# Patient Record
Sex: MALE | Race: BLACK OR AFRICAN AMERICAN | NOT HISPANIC OR LATINO | Employment: FULL TIME | URBAN - METROPOLITAN AREA
[De-identification: names, ages, dates, MRNs, and addresses within clinical notes are randomized per-mention and may not be internally consistent; named-entity substitution may affect disease eponyms.]

---

## 2018-03-26 ENCOUNTER — HOSPITAL ENCOUNTER (EMERGENCY)
Facility: OTHER | Age: 28
Discharge: HOME | End: 2018-03-26
Attending: EMERGENCY MEDICINE
Payer: COMMERCIAL

## 2018-03-26 VITALS
BODY MASS INDEX: 24.25 KG/M2 | HEART RATE: 72 BPM | OXYGEN SATURATION: 100 % | WEIGHT: 160 LBS | DIASTOLIC BLOOD PRESSURE: 86 MMHG | HEIGHT: 68 IN | TEMPERATURE: 98 F | SYSTOLIC BLOOD PRESSURE: 138 MMHG | RESPIRATION RATE: 14 BRPM

## 2018-03-26 DIAGNOSIS — N34.2 URETHRITIS: Primary | ICD-10-CM

## 2018-03-26 DIAGNOSIS — S29.019A THORACIC MYOFASCIAL STRAIN, INITIAL ENCOUNTER: ICD-10-CM

## 2018-03-26 LAB
BILIRUB UR QL STRIP: NEGATIVE
C TRACH DNA SPEC QL NAA+PROBE: NOT DETECTED
CLARITY UR: CLEAR
COLOR UR: YELLOW
GLUCOSE UR QL STRIP: NEGATIVE
HGB UR QL STRIP: NEGATIVE
KETONES UR QL STRIP: NEGATIVE
LEUKOCYTE ESTERASE UR QL STRIP: NEGATIVE
N GONORRHOEA DNA SPEC QL NAA+PROBE: NOT DETECTED
NITRITE UR QL STRIP: NEGATIVE
PH UR STRIP: 6 [PH] (ref 5–8)
PROT UR QL STRIP: NEGATIVE
SP GR UR STRIP: >=1.03 (ref 1–1.03)
URN SPEC COLLECT METH UR: ABNORMAL
UROBILINOGEN UR STRIP-ACNC: NEGATIVE EU/DL

## 2018-03-26 PROCEDURE — 81003 URINALYSIS AUTO W/O SCOPE: CPT

## 2018-03-26 PROCEDURE — 87086 URINE CULTURE/COLONY COUNT: CPT

## 2018-03-26 PROCEDURE — 25000003 PHARM REV CODE 250: Performed by: EMERGENCY MEDICINE

## 2018-03-26 PROCEDURE — 63600175 PHARM REV CODE 636 W HCPCS: Performed by: EMERGENCY MEDICINE

## 2018-03-26 PROCEDURE — 99283 EMERGENCY DEPT VISIT LOW MDM: CPT | Mod: 25

## 2018-03-26 PROCEDURE — 87491 CHLMYD TRACH DNA AMP PROBE: CPT

## 2018-03-26 PROCEDURE — 96372 THER/PROPH/DIAG INJ SC/IM: CPT

## 2018-03-26 RX ORDER — AZITHROMYCIN 250 MG/1
1000 TABLET, FILM COATED ORAL
Status: COMPLETED | OUTPATIENT
Start: 2018-03-26 | End: 2018-03-26

## 2018-03-26 RX ORDER — CEFTRIAXONE 250 MG/1
250 INJECTION, POWDER, FOR SOLUTION INTRAMUSCULAR; INTRAVENOUS
Status: COMPLETED | OUTPATIENT
Start: 2018-03-26 | End: 2018-03-26

## 2018-03-26 RX ORDER — ONDANSETRON 4 MG/1
4 TABLET, ORALLY DISINTEGRATING ORAL
Status: COMPLETED | OUTPATIENT
Start: 2018-03-26 | End: 2018-03-26

## 2018-03-26 RX ORDER — IBUPROFEN 600 MG/1
600 TABLET ORAL EVERY 6 HOURS PRN
Qty: 20 TABLET | Refills: 0 | Status: SHIPPED | OUTPATIENT
Start: 2018-03-26

## 2018-03-26 RX ORDER — IBUPROFEN 600 MG/1
600 TABLET ORAL
Status: COMPLETED | OUTPATIENT
Start: 2018-03-26 | End: 2018-03-26

## 2018-03-26 RX ORDER — METHOCARBAMOL 500 MG/1
1000 TABLET, FILM COATED ORAL 3 TIMES DAILY PRN
Qty: 30 TABLET | Refills: 0 | Status: SHIPPED | OUTPATIENT
Start: 2018-03-26 | End: 2018-03-31

## 2018-03-26 RX ADMIN — AZITHROMYCIN 1000 MG: 250 TABLET, FILM COATED ORAL at 11:03

## 2018-03-26 RX ADMIN — IBUPROFEN 600 MG: 600 TABLET, FILM COATED ORAL at 10:03

## 2018-03-26 RX ADMIN — ONDANSETRON 4 MG: 4 TABLET, ORALLY DISINTEGRATING ORAL at 11:03

## 2018-03-26 RX ADMIN — CEFTRIAXONE SODIUM 250 MG: 250 INJECTION, POWDER, FOR SOLUTION INTRAMUSCULAR; INTRAVENOUS at 11:03

## 2018-03-26 NOTE — ED NOTES
Pt presents with c/o yellow penile discharge x4 days. Pt denies any burning or hematuria. Pt states he has been checked for STD's and results were negative.

## 2018-03-26 NOTE — ED PROVIDER NOTES
Encounter Date: 3/26/2018    SCRIBE #1 NOTE: I, Gypsy Bar, am scribing for, and in the presence of, Dr. Saunders.       History     Chief Complaint   Patient presents with    Penile Discharge     Pt c/o penile discharge x 4 days. He is also c/o LLQ pain. Denies dysuria or hematuria. Pt also wants to have his back checked out for upper back pain for unknown period of time.     Time seen by provider: 9:55 AM    This is a 27 y.o. male who presents with complaint of penile discharge. He reports vague LLQ abdominal pain and urinary frequency. He denies dysuria, penile pain, scrotal swelling, or testicular pain. He notes having unprotected sex. He was seen by his PCP for similar symptoms, and tested negative for STDs.    Patient also reports intermittent upper back pain. He notes heavy lifting at work. He has the area massaged every day and has not taken any OTC medication for pain.     He denies fever, chills, cough, SOB, chest pain, or difficulty ambulating. He quit smoking 6 months ago and reports occasional use of alcohol and marijuana, but denies use of other illicit drugs.      The history is provided by the patient.     Review of patient's allergies indicates:  No Known Allergies  History reviewed. No pertinent past medical history.  History reviewed. No pertinent surgical history.  No family history on file.  Social History   Substance Use Topics    Smoking status: Current Some Day Smoker    Smokeless tobacco: Not on file    Alcohol use Yes      Comment: sometimes     Review of Systems   Constitutional: Negative for chills and fever.   HENT: Negative for congestion and sore throat.    Eyes: Negative for visual disturbance.   Respiratory: Negative for cough and shortness of breath.    Cardiovascular: Negative for chest pain and palpitations.   Gastrointestinal: Positive for abdominal pain. Negative for diarrhea, nausea and vomiting.   Genitourinary: Positive for discharge, frequency and urgency. Negative for  decreased urine volume, dysuria, enuresis, penile pain, penile swelling, scrotal swelling and testicular pain.   Musculoskeletal: Positive for back pain. Negative for gait problem, joint swelling, neck pain and neck stiffness.   Skin: Negative for rash and wound.   Neurological: Negative for weakness, numbness and headaches.   Psychiatric/Behavioral: Negative for behavioral problems and confusion.       Physical Exam     Initial Vitals [03/26/18 0934]   BP Pulse Resp Temp SpO2   137/83 62 18 97.6 °F (36.4 °C) 99 %      MAP       101         Physical Exam    Nursing note and vitals reviewed.  Constitutional: He appears well-developed and well-nourished. He is not diaphoretic. No distress.   HENT:   Head: Normocephalic and atraumatic.   Eyes: Conjunctivae and EOM are normal. No scleral icterus.   Neck: Normal range of motion. Neck supple.   Cardiovascular: Normal rate, regular rhythm and normal heart sounds. Exam reveals no gallop and no friction rub.    No murmur heard.  Pulmonary/Chest: Breath sounds normal. No respiratory distress. He has no wheezes. He has no rhonchi. He has no rales.   Abdominal: Soft. Bowel sounds are normal. He exhibits no distension. There is no tenderness. There is no rebound and no guarding.   Musculoskeletal: Normal range of motion. He exhibits tenderness (bilateral thoracic muscles). He exhibits no edema.   Neurological: He is alert and oriented to person, place, and time.   Skin: Skin is warm and dry. No rash and no abscess noted. No erythema. No pallor.   Psychiatric: He has a normal mood and affect. His behavior is normal.         ED Course   Procedures  Labs Reviewed   URINALYSIS - Abnormal; Notable for the following:        Result Value    Specific Gravity, UA >=1.030 (*)     All other components within normal limits   C. TRACHOMATIS/N. GONORRHOEAE BY AMP DNA   CULTURE, URINE             Medical Decision Making:   Clinical Tests:   Lab Tests: Ordered and Reviewed  ED  Management:  Emergent evaluation a 27-year-old male with multiple complaints including dysuria, upper back pain after heavy lifting, and vague left lower quadrant abdominal pain.  Vital signs and physical exam are benign.  Only abnormality on physical exam is tenderness in the thoracic paraspinal muscles.  Urinalysis appears clear, but patient requested STD treatment.  He was treated with Rocephin and azithromycin, ibuprofen was given for back pain.  I suspect nonspecific urethritis, thoracic strain.  Although he had complained of abdominal pain, abdomen was completely soft and nontender on exam.  I do not suspect testicular torsion and he declined genital evaluation.  He was discharged in good condition.            Scribe Attestation:   Scribe #1: I performed the above scribed service and the documentation accurately describes the services I performed. I attest to the accuracy of the note.    Attending Attestation:           Physician Attestation for Scribe:  Physician Attestation Statement for Scribe #1: I, Dr. Saunders, reviewed documentation, as scribed by Gypsy Bar in my presence, and it is both accurate and complete.                    Clinical Impression:     1. Urethritis    2. Thoracic myofascial strain, initial encounter                               Kae Saunders MD  03/27/18 6896

## 2018-03-26 NOTE — ED NOTES
"Pt anxious about IM injection as pt reports getting ceftriaxone shot before and states "It burned so bad." Pt counseled on injection and care after injection. Injection given to right vastus lateralis. Pt tolerated well.   "

## 2018-03-27 LAB — BACTERIA UR CULT: NO GROWTH

## 2018-10-03 ENCOUNTER — OFFICE VISIT (OUTPATIENT)
Dept: UROLOGY | Facility: CLINIC | Age: 28
End: 2018-10-03
Payer: COMMERCIAL

## 2018-10-03 VITALS
DIASTOLIC BLOOD PRESSURE: 68 MMHG | WEIGHT: 183 LBS | SYSTOLIC BLOOD PRESSURE: 115 MMHG | HEART RATE: 76 BPM | HEIGHT: 68 IN | BODY MASS INDEX: 27.74 KG/M2

## 2018-10-03 DIAGNOSIS — R36.9 PENILE DISCHARGE: Primary | ICD-10-CM

## 2018-10-03 DIAGNOSIS — R30.0 DYSURIA: ICD-10-CM

## 2018-10-03 PROCEDURE — 99203 OFFICE O/P NEW LOW 30 MIN: CPT | Mod: 25,S$GLB,, | Performed by: NURSE PRACTITIONER

## 2018-10-03 PROCEDURE — 99999 PR PBB SHADOW E&M-EST. PATIENT-LVL IV: CPT | Mod: PBBFAC,,, | Performed by: NURSE PRACTITIONER

## 2018-10-03 PROCEDURE — 87661 TRICHOMONAS VAGINALIS AMPLIF: CPT

## 2018-10-03 PROCEDURE — 87086 URINE CULTURE/COLONY COUNT: CPT

## 2018-10-03 PROCEDURE — 81002 URINALYSIS NONAUTO W/O SCOPE: CPT | Mod: S$GLB,,, | Performed by: NURSE PRACTITIONER

## 2018-10-03 PROCEDURE — 87491 CHLMYD TRACH DNA AMP PROBE: CPT

## 2018-10-03 NOTE — PATIENT INSTRUCTIONS
Good water intake 2-3 liters per day    Avoid Bladder Irritants: Tea, coffee, caffeine, alcohol, artificial sweeteners, citrus, spicy foods, acidic foods,chocolate, tomato-based foods, smoking    Use protection (condoms)             Cystoscopy    Cystoscopy is a procedure that lets your doctor look directly inside your urethra and bladder. It can be used to:  · Help diagnose a problem with your urethra, bladder, or kidneys.  · Take a sample (biopsy) of bladder or urethral tissue.  · Treat certain problems (such as removing kidney stones).  · Place a stent to bypass an obstruction.  · Take special X-rays of the kidneys.  Based on the findings, your doctor may recommend other tests or treatments.  What is a cystoscope?  A cystoscope is a telescope-like instrument that contains lenses and fiberoptics (small glass wires that make bright light). The cystoscope may be straight and rigid, or flexible to bend around curves in the urethra. The doctor may look directly into the cystoscope, or project the image onto a monitor.  Getting ready  · Ask your doctor if you should stop taking any medicines before the procedure.  · Ask whether you should avoid eating or drinking anything after midnight before the procedure.  · Follow any other instructions your doctor gives you.  Tell your doctor before the exam if you:  · Take any medicines, such as aspirin or blood thinners  · Have allergies to any medicines  · Are pregnant   The procedure  Cystoscopy is done in the doctors office, surgery center, or hospital. The doctor and a nurse are present during the procedure. It takes only a few minutes, longer if a biopsy, X-ray, or treatment needs to be done.  During the procedure:  · You lie on an exam table on your back, knees bent and legs apart. You are covered with a drape.  · Your urethra and the area around it are washed. Anesthetic jelly may be applied to numb the urethra. Other pain medicine is usually not needed. In some cases,  you may be offered a mild sedative to help you relax. If a more extensive procedure is to be done, such as a biopsy or kidney stone removal, general anesthesia may be needed.  · The cystoscope is inserted. A sterile fluid is put into the bladder to expand it. You may feel pressure from this fluid.  · When the procedure is done, the cystoscope is removed.  After the procedure  If you had a sedative, general anesthesia, or spinal anesthesia, you must have someone drive you home. Once youre home:  · Drink plenty of fluids.  · You may have burning or light bleeding when you urinate--this is normal.  · Medicines may be prescribed to ease any discomfort or prevent infection. Take these as directed.  · Call your doctor if you have heavy bleeding or blood clots, burning that lasts more than a day, a fever over 100°F  (38° C), or trouble urinating.  Date Last Reviewed: 1/1/2017  © 8579-2123 [employer]. [address] [place] [address], [place] [address]. All rights reserved. This information is not intended as a substitute for professional medical care. Always follow your healthcare professional's instructions.

## 2018-10-04 LAB
BACTERIA UR CULT: NO GROWTH
C TRACH DNA SPEC QL NAA+PROBE: NOT DETECTED
N GONORRHOEA DNA SPEC QL NAA+PROBE: NOT DETECTED

## 2018-10-04 NOTE — PROGRESS NOTES
"Subjective:       Patient ID: Elvis Orosco is a 27 y.o. male.    Chief Complaint: Penile discharge      HPI: Elvis Orosco is a 27 y.o. Black or  male who presents today for evaluation and management of penile discharge. This is his initial clinic visit.    Pt was seen in ED 3/26/18 for penile discharge and LLQ pain. He was seen previously by his PCP for same complaint and all STI testing was negative but was treated with antibiotics. He was treated again in ED with antibiotics. His urine culture was negative and gonorrhea/chlamydia was not detected. In 5/2018, discharge reappeared and he was treated again with antibiotics. Symptoms resolved. About 1 month ago, his girlfriend has a "bacterial" infection and was treated. He was asymptomatic at the time but he was given antibiotics just in case.    Today he reports white discharge from penis that started 1-2 weeks ago. He reports if he only drinks water, there is no discharge but if he drinks soft drinks or any bladder irritants, he has the discharge. He reports mild dysuria that worsens with alcohol and soft drinks. Denies hematuria or flank pain. He reports LLQ abdominal pain that is intermittent and occurs only a few times a month. Denies LLQ pain today in clinic. He reports his left testicle will have a discomfort 1-2/10 that may occur once a week. Denies testicle pain in clinic today. Denies scrotal swelling. Denies any history of kidney stones. Denies multiple sexual partners. He reports he does have unprotected intercourse with his girlfriend.   Denies any urinary complaints. Denies f/c/n/v.     Review of patient's allergies indicates:  No Known Allergies    Current Outpatient Medications   Medication Sig Dispense Refill    ibuprofen (ADVIL,MOTRIN) 600 MG tablet Take 1 tablet (600 mg total) by mouth every 6 (six) hours as needed for Pain. 20 tablet 0     No current facility-administered medications for this visit.        History reviewed. No " pertinent past medical history.    History reviewed. No pertinent surgical history.    History reviewed. No pertinent family history.    Review of Systems   Constitutional: Negative for chills, diaphoresis and fever.   HENT: Negative for congestion.    Eyes: Negative for discharge.   Respiratory: Negative for chest tightness and shortness of breath.    Cardiovascular: Negative for chest pain and palpitations.   Gastrointestinal: Positive for abdominal pain (LLQ- intermittent). Negative for constipation, diarrhea, nausea and vomiting.   Genitourinary: Positive for discharge, dysuria (intermittent) and testicular pain (intermittent). Negative for decreased urine volume, difficulty urinating, flank pain, frequency, genital sores, hematuria, penile pain, penile swelling, scrotal swelling and urgency.   Musculoskeletal: Negative for gait problem.   Skin: Negative for rash.   Allergic/Immunologic: Negative for immunocompromised state.   Neurological: Negative for seizures and headaches.   Hematological: Negative for adenopathy.   Psychiatric/Behavioral: Negative for confusion.         All other systems were reviewed and were negative.    Objective:     Vitals:    10/03/18 1559   BP: 115/68   Pulse: 76        Physical Exam   Nursing note and vitals reviewed.  Constitutional: He is oriented to person, place, and time. He appears well-developed and well-nourished.  Non-toxic appearance. He does not have a sickly appearance. He does not appear ill. No distress.   HENT:   Head: Normocephalic.   Eyes: Right eye exhibits no discharge. Left eye exhibits no discharge.   Neck: Normal range of motion.   Cardiovascular: Normal rate and regular rhythm.    Pulmonary/Chest: Effort normal. No respiratory distress.   Abdominal: Soft. He exhibits no distension. There is no tenderness. There is no rebound and no guarding.   Genitourinary: Right testis shows no mass, no swelling and no tenderness. Left testis shows no mass, no swelling and  no tenderness. Circumcised. No penile erythema or penile tenderness. No discharge found.   Genitourinary Comments: Pt refused prostate exam today in clinic   Musculoskeletal: Normal range of motion.   Neurological: He is alert and oriented to person, place, and time.   Skin: Skin is warm and dry.     Psychiatric: He has a normal mood and affect. His behavior is normal. Judgment and thought content normal.       POCT UA: sp grav 1.010, pH 8, negative results    Assessment:       1. Penile discharge    2. Dysuria        Plan:     Elvis was seen today for other.    Diagnoses and all orders for this visit:    Penile discharge  -     POCT urinalysis, dipstick or tablet reag  -     Urine culture  -     C. trachomatis/N. gonorrhoeae by AMP DNA [facility]; Urine  -     Trichomonas vaginalis, RNA, Qual, Urine  -     RPR; Future  -     HERPES SIMPLEX 1&2 IGG; Future    Dysuria  -     POCT urinalysis, dipstick or tablet reag  -     Urine culture  -     C. trachomatis/N. gonorrhoeae by AMP DNA [facility]; Urine  -     Trichomonas vaginalis, RNA, Qual, Urine  -     RPR; Future  -     HERPES SIMPLEX 1&2 IGG; Future      -Discussed plan with patient  -Urine specimen sent for urine culture, trichomonas, chlamydia/gonorrhea  -RPR and herpes lab ordered and scheduled  -Increase water to 2-3 liters per day  -Avoid Bladder Irritants: Tea, coffee, caffeine, alcohol, artificial sweeteners, citrus, spicy foods, acidic foods,chocolate, tomato-based foods, smoking  -If all testing in negative and continues to have symptoms, recommended cysto to evaluate the bladder further  -RTC 4-6 weeks     I spent 35 minutes with the patient of which more than half was spent in coordinating the patient's care as well as in direct consultation with the patient in regards to our treatment and plan.

## 2018-10-05 LAB
TRICHOMONAS VAGINALIS RNA, QUAL, SOURCE: NORMAL
TRICHOMONAS VAGINALIS, QL, TMA: NOT DETECTED

## 2018-10-08 ENCOUNTER — TELEPHONE (OUTPATIENT)
Dept: PEDIATRIC UROLOGY | Facility: CLINIC | Age: 28
End: 2018-10-08

## 2018-10-08 ENCOUNTER — PATIENT MESSAGE (OUTPATIENT)
Dept: UROLOGY | Facility: CLINIC | Age: 28
End: 2018-10-08

## 2018-10-08 NOTE — TELEPHONE ENCOUNTER
Called patient regarding his STI testing results.    RPR non-reactive  Gonorrhea / chlamydia not detected  Urine culture negative    Herpes 1&2 positive  Pt does not have any lesions at this time so instructed for him to notify clinic if he has an outbreak for initial treatment.    Discussed importance of using protection (condoms) for intercourse.

## 2018-10-08 NOTE — TELEPHONE ENCOUNTER
Returned patient's call regarding HSV treatment. When patient has an outbreak, he can call clinic for treatment. He verbalized understanding    He has been drinking water and white discharge has not been occurring. Discussed it could be prostate secretions or semen. He will need prostate exam and further evaluation if continues. He verbalized understanding.

## 2018-10-13 ENCOUNTER — HOSPITAL ENCOUNTER (EMERGENCY)
Facility: OTHER | Age: 28
Discharge: HOME | End: 2018-10-13
Attending: EMERGENCY MEDICINE
Payer: COMMERCIAL

## 2018-10-13 VITALS
OXYGEN SATURATION: 100 % | HEIGHT: 71 IN | BODY MASS INDEX: 25.52 KG/M2 | RESPIRATION RATE: 16 BRPM | DIASTOLIC BLOOD PRESSURE: 82 MMHG | SYSTOLIC BLOOD PRESSURE: 146 MMHG | TEMPERATURE: 98 F | HEART RATE: 69 BPM

## 2018-10-13 DIAGNOSIS — Z76.0 MEDICATION REFILL: ICD-10-CM

## 2018-10-13 DIAGNOSIS — Z71.1 PHYSICALLY WELL BUT WORRIED: Primary | ICD-10-CM

## 2018-10-13 PROCEDURE — 99283 EMERGENCY DEPT VISIT LOW MDM: CPT

## 2018-10-13 RX ORDER — MELOXICAM 7.5 MG/1
7.5 TABLET ORAL DAILY
Qty: 10 TABLET | Refills: 0 | Status: SHIPPED | OUTPATIENT
Start: 2018-10-13 | End: 2018-10-23

## 2018-10-13 NOTE — ED PROVIDER NOTES
"Encounter Date: 10/13/2018       History     Chief Complaint   Patient presents with    wellness check     pt requesting blood work. Also requesting prescription of meloxicam for "muscle pain".      Patient is a 27-year-old male who presents with request for 2nd opinion after receiving diagnosis of genital herpes by his urologist.  He reports this diagnosis was made at [facility] and patient admits that he currently has no symptoms at this time.  Patient admits he does not believe this diagnosis and is requesting a 2nd opinion with definitive blood testing result done today in the emergency department.  He also has request for refill on prescription for Mobic.  He reports that he takes this only occasionally for muscle ache.  He has no current muscle aches at this time.  He has no associated fever, chills, nausea, vomiting, chest pain or shortness of breath.  He is currently accompanied by his female significant other and a young child who is at bedside.          Review of patient's allergies indicates:  No Known Allergies  No past medical history on file.  No past surgical history on file.  No family history on file.  Social History     Tobacco Use    Smoking status: Current Some Day Smoker   Substance Use Topics    Alcohol use: Yes     Comment: sometimes    Drug use: No     Review of Systems   Constitutional: Negative for fever.   HENT: Negative for sore throat.    Respiratory: Negative for shortness of breath.    Cardiovascular: Negative for chest pain.   Gastrointestinal: Negative for nausea.   Genitourinary: Negative for dysuria.   Musculoskeletal: Negative for back pain.   Skin: Negative for rash.   Neurological: Negative for weakness.   Hematological: Does not bruise/bleed easily.       Physical Exam     Initial Vitals [10/13/18 1252]   BP Pulse Resp Temp SpO2   (!) 146/82 69 16 98.4 °F (36.9 °C) 100 %      MAP       --         Physical Exam    Nursing note and vitals reviewed.  Constitutional: " He appears well-developed and well-nourished. He is not diaphoretic. No distress.   Healthy-appearing male in no acute distress or apparent pain.  Makes good eye contact, speaks in clear full sentences and ambulates with ease.   HENT:   Head: Normocephalic and atraumatic.   Eyes: Conjunctivae and EOM are normal. Pupils are equal, round, and reactive to light. Right eye exhibits no discharge. Left eye exhibits no discharge. No scleral icterus.   Neck: Normal range of motion.   Cardiovascular: Normal rate, regular rhythm, normal heart sounds and intact distal pulses. Exam reveals no gallop and no friction rub.    No murmur heard.  Pulmonary/Chest: Breath sounds normal. He has no wheezes. He has no rhonchi. He has no rales.   Abdominal: Soft. Bowel sounds are normal. There is no tenderness. There is no rebound and no guarding.   Genitourinary:   Genitourinary Comments: Genital exam deferred as patient has no current symptoms.   Musculoskeletal: Normal range of motion. He exhibits no edema or tenderness.   Lymphadenopathy:     He has no cervical adenopathy.   Neurological: He is alert and oriented to person, place, and time. He has normal strength. No cranial nerve deficit.   Skin: Skin is warm. Capillary refill takes less than 2 seconds. No rash and no abscess noted. No erythema.   Psychiatric: He has a normal mood and affect. His behavior is normal. Thought content normal.         ED Course   Procedures  Labs Reviewed - No data to display       Imaging Results    None          Medical Decision Making:   ED Management:  Urgent evaluation a 27-year-old male who presents with request for 2nd opinion and blood testing for genital herpes in the setting of no outbreak.  He is also requesting medication refill for Mobic.  He is afebrile, nontoxic appearing, hemodynamically stable. Physical exam outlined above and is unremarkable.  Genital exam is deferred given patient has no symptoms currently.  I cannot offer patient blood  work for HSV screening here in the emergency department however I provided him with [facility] contact information as well as [facility] contact information so that he can establish care for 2nd opinion and or establish comprehensive STD testing in the outpatient setting.  Mobic prescription is written for patient.  He is educated on ED return precautions and verbalizes understanding.  He is stable for discharge. Patient is seen in PIT and no further RN evaluation is warranted.                      Clinical Impression:   The primary encounter diagnosis was Physically well but worried. A diagnosis of Medication refill was also pertinent to this visit.                             Jojo Zhou PA-C  10/13/18 3309

## 2018-11-20 ENCOUNTER — TELEPHONE (OUTPATIENT)
Dept: UROLOGY | Facility: CLINIC | Age: 28
End: 2018-11-20

## 2018-11-20 NOTE — TELEPHONE ENCOUNTER
"Spoke with pt to reschedule his 11/21 appt due to book out and pt states he wants to cancel appt. States "go ahead and just cancel appt because I found out it was something else causing problem". Encouraged to call office if needs to be seen in the future. Voiced  understanding  "

## 2021-09-23 ENCOUNTER — IMMUNIZATION (OUTPATIENT)
Dept: PRIMARY CARE CLINIC | Facility: CLINIC | Age: 31
End: 2021-09-23
Payer: COMMERCIAL

## 2021-09-23 DIAGNOSIS — Z23 NEED FOR VACCINATION: Primary | ICD-10-CM

## 2021-09-23 PROCEDURE — 91301 COVID-19, MRNA, LNP-S, PF, 100 MCG/0.5 ML DOSE VACCINE: CPT | Mod: PBBFAC | Performed by: FAMILY MEDICINE

## 2021-10-21 ENCOUNTER — IMMUNIZATION (OUTPATIENT)
Dept: PRIMARY CARE CLINIC | Facility: CLINIC | Age: 31
End: 2021-10-21
Payer: COMMERCIAL

## 2021-10-21 DIAGNOSIS — Z23 NEED FOR VACCINATION: Primary | ICD-10-CM

## 2021-10-21 PROCEDURE — 0012A COVID-19, MRNA, LNP-S, PF, 100 MCG/0.5 ML DOSE VACCINE: CPT | Mod: PBBFAC | Performed by: FAMILY MEDICINE

## 2023-05-18 ENCOUNTER — HOSPITAL ENCOUNTER (EMERGENCY)
Facility: HOSPITAL | Age: 33
Discharge: HOME | End: 2023-05-19
Attending: EMERGENCY MEDICINE
Payer: COMMERCIAL

## 2023-05-18 DIAGNOSIS — R53.83 FATIGUE, UNSPECIFIED TYPE: Primary | ICD-10-CM

## 2023-05-18 NOTE — Clinical Note
"Elvis Starks" Ana Luisa was seen and treated in our emergency department on 5/18/2023.  He may return to work on 05/23/2023.       If you have any questions or concerns, please don't hesitate to call.      Anand Perkins MD"

## 2023-05-19 VITALS
WEIGHT: 165 LBS | HEART RATE: 91 BPM | HEIGHT: 68 IN | SYSTOLIC BLOOD PRESSURE: 121 MMHG | TEMPERATURE: 100 F | RESPIRATION RATE: 18 BRPM | DIASTOLIC BLOOD PRESSURE: 64 MMHG | OXYGEN SATURATION: 99 % | BODY MASS INDEX: 25.01 KG/M2

## 2023-05-19 LAB
ALBUMIN SERPL BCP-MCNC: 4.3 G/DL (ref 3.5–5.2)
ALP SERPL-CCNC: 36 U/L (ref 55–135)
ALT SERPL W/O P-5'-P-CCNC: 26 U/L (ref 10–44)
ANION GAP SERPL CALC-SCNC: 11 MMOL/L (ref 8–16)
AST SERPL-CCNC: 37 U/L (ref 10–40)
BASOPHILS # BLD AUTO: 0.02 K/UL (ref 0–0.2)
BASOPHILS NFR BLD: 0.2 % (ref 0–1.9)
BILIRUB SERPL-MCNC: 0.7 MG/DL (ref 0.1–1)
BUN SERPL-MCNC: 13 MG/DL (ref 6–20)
CALCIUM SERPL-MCNC: 9 MG/DL (ref 8.7–10.5)
CHLORIDE SERPL-SCNC: 103 MMOL/L (ref 95–110)
CK SERPL-CCNC: 277 U/L (ref 20–200)
CO2 SERPL-SCNC: 23 MMOL/L (ref 23–29)
CREAT SERPL-MCNC: 1 MG/DL (ref 0.5–1.4)
DIFFERENTIAL METHOD: ABNORMAL
EOSINOPHIL # BLD AUTO: 0.1 K/UL (ref 0–0.5)
EOSINOPHIL NFR BLD: 1.1 % (ref 0–8)
ERYTHROCYTE [DISTWIDTH] IN BLOOD BY AUTOMATED COUNT: 13.6 % (ref 11.5–14.5)
EST. GFR  (NO RACE VARIABLE): >60 ML/MIN/1.73 M^2
GLUCOSE SERPL-MCNC: 127 MG/DL (ref 70–110)
HCT VFR BLD AUTO: 39.5 % (ref 40–54)
HGB BLD-MCNC: 13.2 G/DL (ref 14–18)
IMM GRANULOCYTES # BLD AUTO: 0.04 K/UL (ref 0–0.04)
IMM GRANULOCYTES NFR BLD AUTO: 0.3 % (ref 0–0.5)
LYMPHOCYTES # BLD AUTO: 0.4 K/UL (ref 1–4.8)
LYMPHOCYTES NFR BLD: 3.3 % (ref 18–48)
MCH RBC QN AUTO: 28.4 PG (ref 27–31)
MCHC RBC AUTO-ENTMCNC: 33.4 G/DL (ref 32–36)
MCV RBC AUTO: 85 FL (ref 82–98)
MONOCYTES # BLD AUTO: 0.3 K/UL (ref 0.3–1)
MONOCYTES NFR BLD: 2.2 % (ref 4–15)
NEUTROPHILS # BLD AUTO: 10.9 K/UL (ref 1.8–7.7)
NEUTROPHILS NFR BLD: 92.9 % (ref 38–73)
NRBC BLD-RTO: 0 /100 WBC
PLATELET # BLD AUTO: 269 K/UL (ref 150–450)
PMV BLD AUTO: 9.4 FL (ref 9.2–12.9)
POTASSIUM SERPL-SCNC: 3.5 MMOL/L (ref 3.5–5.1)
PROT SERPL-MCNC: 7.3 G/DL (ref 6–8.4)
RBC # BLD AUTO: 4.64 M/UL (ref 4.6–6.2)
SODIUM SERPL-SCNC: 137 MMOL/L (ref 136–145)
WBC # BLD AUTO: 11.74 K/UL (ref 3.9–12.7)

## 2023-05-19 PROCEDURE — 82550 ASSAY OF CK (CPK): CPT | Performed by: STUDENT IN AN ORGANIZED HEALTH CARE EDUCATION/TRAINING PROGRAM

## 2023-05-19 PROCEDURE — 99284 EMERGENCY DEPT VISIT MOD MDM: CPT

## 2023-05-19 PROCEDURE — 63600175 PHARM REV CODE 636 W HCPCS: Performed by: STUDENT IN AN ORGANIZED HEALTH CARE EDUCATION/TRAINING PROGRAM

## 2023-05-19 PROCEDURE — 93005 ELECTROCARDIOGRAM TRACING: CPT | Performed by: INTERNAL MEDICINE

## 2023-05-19 PROCEDURE — 80053 COMPREHEN METABOLIC PANEL: CPT | Performed by: STUDENT IN AN ORGANIZED HEALTH CARE EDUCATION/TRAINING PROGRAM

## 2023-05-19 PROCEDURE — 93010 EKG 12-LEAD: ICD-10-PCS | Mod: ,,, | Performed by: INTERNAL MEDICINE

## 2023-05-19 PROCEDURE — 85025 COMPLETE CBC W/AUTO DIFF WBC: CPT | Performed by: STUDENT IN AN ORGANIZED HEALTH CARE EDUCATION/TRAINING PROGRAM

## 2023-05-19 PROCEDURE — 93010 ELECTROCARDIOGRAM REPORT: CPT | Mod: ,,, | Performed by: INTERNAL MEDICINE

## 2023-05-19 PROCEDURE — 96360 HYDRATION IV INFUSION INIT: CPT

## 2023-05-19 RX ADMIN — SODIUM CHLORIDE, SODIUM LACTATE, POTASSIUM CHLORIDE, AND CALCIUM CHLORIDE 1000 ML: .6; .31; .03; .02 INJECTION, SOLUTION INTRAVENOUS at 12:05

## 2023-05-19 NOTE — ED PROVIDER NOTES
Encounter Date: 5/18/2023       History     Chief Complaint   Patient presents with    Fatigue     Pt states he works in the heat and tonight felt generalized weakness, near syncopal episode with nausea and vomiting      HPI    32-year-old male with no significant past medical history presenting to feeling fatigued and 1 episode of nausea and vomiting today.  Patient is a male man and was working outside all day.  He reports drinking 4 energy drinks and not eating much food or drinking any water.  At the end of the day patient was feeling fatigued and took a nap.  After waking up he continued to feel very tired and had an episode of lightheadedness and nausea and vomiting.  Patient denies any loss of consciousness, chest pain, shortness of breath, fever, muscle cramps.  After his episode of vomiting he reports no longer having any nausea in his feeling better.    Review of patient's allergies indicates:   Allergen Reactions    Shellfish containing products Swelling     No past medical history on file.  No past surgical history on file.  No family history on file.  Social History     Tobacco Use    Smoking status: Some Days   Substance Use Topics    Alcohol use: Yes     Comment: sometimes    Drug use: No     Review of Systems   Constitutional:  Negative for activity change, appetite change and fever.   HENT:  Negative for congestion and rhinorrhea.    Respiratory:  Negative for shortness of breath.    Cardiovascular:  Negative for chest pain and palpitations.   Gastrointestinal:  Positive for nausea and vomiting. Negative for abdominal pain.   Genitourinary:  Negative for dysuria.   Musculoskeletal:  Negative for back pain.   Skin:  Negative for rash and wound.   Neurological:  Positive for light-headedness. Negative for syncope, weakness and headaches.     Physical Exam     Initial Vitals [05/18/23 2257]   BP Pulse Resp Temp SpO2   (!) 144/78 107 18 99.3 °F (37.4 °C) 96 %      MAP       --         Physical  Exam    Constitutional: He appears well-developed and well-nourished.   HENT:   Head: Normocephalic and atraumatic.   Eyes: Conjunctivae are normal.   Cardiovascular:  Normal rate.           Pulmonary/Chest: Breath sounds normal.   Abdominal: Abdomen is soft. He exhibits no distension. There is no abdominal tenderness.   Musculoskeletal:         General: No tenderness or edema. Normal range of motion.     Neurological: He is alert and oriented to person, place, and time.   Skin: Skin is warm and dry. Capillary refill takes less than 2 seconds. No rash noted.       ED Course   Procedures  Labs Reviewed   CBC W/ AUTO DIFFERENTIAL - Abnormal; Notable for the following components:       Result Value    Hemoglobin 13.2 (*)     Hematocrit 39.5 (*)     Gran # (ANC) 10.9 (*)     Lymph # 0.4 (*)     Gran % 92.9 (*)     Lymph % 3.3 (*)     Mono % 2.2 (*)     All other components within normal limits   COMPREHENSIVE METABOLIC PANEL - Abnormal; Notable for the following components:    Glucose 127 (*)     Alkaline Phosphatase 36 (*)     All other components within normal limits   CK - Abnormal; Notable for the following components:     (*)     All other components within normal limits     EKG Readings: (Independently Interpreted)   EKG interpreted by me with normal sinus rhythm, normal axis, normal intervals, no significant ST elevation or depression     Imaging Results    None          Medications   lactated ringers bolus 1,000 mL (0 mLs Intravenous Stopped 5/19/23 0142)     Medical Decision Making:   Initial Assessment:   32-year-old male presenting with fatigue and episode of nausea and vomiting after drinking 4 energy drinks and working outside all day.  Vital signs here are stable.  On exam patient is well-appearing with no acute findings.   Differential Diagnosis:   Hypovolemia, rhabdo, MARCO, electrolyte derangement, side effect from energy drinks  Clinical Tests:   Lab Tests: Ordered and Reviewed       <> Summary of  Lab: No MARCO, CK minimally elevated.  H&H is mildly low.  Glucose noted to be in the 130s. No significant electrolyte derangement  ED Management:  Patient received 1 L IV fluids.  Currently feeling at baseline and improved.  Advised patient to follow-up with PCP regarding mild anemia and hypoglycemia.  Encouraged patient to drink more water for hydration.  Return precautions were discussed.  Patient understands and agrees with plan.    Anand Perkins MD  Internal/Emergency Medicine, PGY-V              Attending Attestation:   Physician Attestation Statement for Resident:  As the supervising MD   I agree with the above history.  -:   As the supervising MD I agree with the above PE.     As the supervising MD I agree with the above treatment, course, plan, and disposition.    I have reviewed and agree with the residents interpretation of the following: lab data.                            Clinical Impression:   Final diagnoses:  [R53.83] Fatigue, unspecified type (Primary)        ED Disposition Condition    Discharge Stable          ED Prescriptions    None       Follow-up Information       Follow up With Specialties Details Why Contact Info Additional Information    [facility] - Emergency Dept Emergency Medicine  As needed, If symptoms worsen [address] [facility] [id number] [phone] 1st floor    [facility] - Med Records  Schedule an appointment as soon as possible for a visit  For follow-up as needed [address] [facility] [id number] [phone]                Anand Perkins MD  Resident  05/19/23 0102       Juan Moore MD  05/19/23 0256

## 2023-09-30 ENCOUNTER — HOSPITAL ENCOUNTER (EMERGENCY)
Facility: HOSPITAL | Age: 33
Discharge: HOME | End: 2023-09-30
Attending: STUDENT IN AN ORGANIZED HEALTH CARE EDUCATION/TRAINING PROGRAM
Payer: COMMERCIAL

## 2023-09-30 VITALS
WEIGHT: 175 LBS | TEMPERATURE: 99 F | DIASTOLIC BLOOD PRESSURE: 74 MMHG | OXYGEN SATURATION: 100 % | SYSTOLIC BLOOD PRESSURE: 130 MMHG | RESPIRATION RATE: 16 BRPM | HEIGHT: 68 IN | HEART RATE: 60 BPM | BODY MASS INDEX: 26.52 KG/M2

## 2023-09-30 DIAGNOSIS — Z20.2 POSSIBLE EXPOSURE TO STD: ICD-10-CM

## 2023-09-30 DIAGNOSIS — N48.89 PENILE PAIN: Primary | ICD-10-CM

## 2023-09-30 LAB
BILIRUB UR QL STRIP: NEGATIVE
CLARITY UR: CLEAR
COLOR UR: YELLOW
GLUCOSE UR QL STRIP: NEGATIVE
HGB UR QL STRIP: NEGATIVE
HIV1+2 IGG SERPL QL IA.RAPID: NEGATIVE
KETONES UR QL STRIP: NEGATIVE
LEUKOCYTE ESTERASE UR QL STRIP: NEGATIVE
NITRITE UR QL STRIP: NEGATIVE
PH UR STRIP: 6 [PH] (ref 5–8)
PROT UR QL STRIP: ABNORMAL
SP GR UR STRIP: >1.03 (ref 1–1.03)
URN SPEC COLLECT METH UR: ABNORMAL
UROBILINOGEN UR STRIP-ACNC: NEGATIVE EU/DL

## 2023-09-30 PROCEDURE — 86592 SYPHILIS TEST NON-TREP QUAL: CPT

## 2023-09-30 PROCEDURE — 87529 HSV DNA AMP PROBE: CPT | Mod: 91

## 2023-09-30 PROCEDURE — 99284 EMERGENCY DEPT VISIT MOD MDM: CPT | Mod: 25

## 2023-09-30 PROCEDURE — 63600175 PHARM REV CODE 636 W HCPCS: Performed by: STUDENT IN AN ORGANIZED HEALTH CARE EDUCATION/TRAINING PROGRAM

## 2023-09-30 PROCEDURE — 25000003 PHARM REV CODE 250

## 2023-09-30 PROCEDURE — 81003 URINALYSIS AUTO W/O SCOPE: CPT

## 2023-09-30 PROCEDURE — 86703 HIV-1/HIV-2 1 RESULT ANTBDY: CPT

## 2023-09-30 PROCEDURE — 87491 CHLMYD TRACH DNA AMP PROBE: CPT

## 2023-09-30 PROCEDURE — 25000003 PHARM REV CODE 250: Performed by: STUDENT IN AN ORGANIZED HEALTH CARE EDUCATION/TRAINING PROGRAM

## 2023-09-30 PROCEDURE — 96365 THER/PROPH/DIAG IV INF INIT: CPT

## 2023-09-30 RX ORDER — BACITRACIN ZINC 500 UNIT/G
OINTMENT (GRAM) TOPICAL 2 TIMES DAILY
Qty: 28 G | Refills: 0 | Status: SHIPPED | OUTPATIENT
Start: 2023-09-30 | End: 2023-10-07

## 2023-09-30 RX ORDER — DOXYCYCLINE 100 MG/1
100 CAPSULE ORAL ONCE
Status: COMPLETED | OUTPATIENT
Start: 2023-09-30 | End: 2023-09-30

## 2023-09-30 RX ORDER — BACITRACIN 500 [USP'U]/G
OINTMENT TOPICAL
Status: COMPLETED | OUTPATIENT
Start: 2023-09-30 | End: 2023-09-30

## 2023-09-30 RX ORDER — ACETAMINOPHEN 500 MG
1000 TABLET ORAL
Status: COMPLETED | OUTPATIENT
Start: 2023-09-30 | End: 2023-09-30

## 2023-09-30 RX ORDER — CEFTRIAXONE 1 G/1
500 INJECTION, POWDER, FOR SOLUTION INTRAMUSCULAR; INTRAVENOUS
Status: DISCONTINUED | OUTPATIENT
Start: 2023-09-30 | End: 2023-09-30

## 2023-09-30 RX ORDER — DOXYCYCLINE 100 MG/1
100 CAPSULE ORAL 2 TIMES DAILY
Qty: 14 CAPSULE | Refills: 0 | Status: SHIPPED | OUTPATIENT
Start: 2023-09-30 | End: 2023-10-07

## 2023-09-30 RX ADMIN — ACETAMINOPHEN 1000 MG: 500 TABLET ORAL at 09:09

## 2023-09-30 RX ADMIN — BACITRACIN: 500 OINTMENT TOPICAL at 01:09

## 2023-09-30 RX ADMIN — CEFTRIAXONE SODIUM 500 MG: 500 INJECTION, POWDER, FOR SOLUTION INTRAMUSCULAR; INTRAVENOUS at 12:09

## 2023-09-30 RX ADMIN — DOXYCYCLINE HYCLATE 100 MG: 100 CAPSULE ORAL at 12:09

## 2023-09-30 NOTE — Clinical Note
"Elvis Starks" Ana Luisa was seen and treated in our emergency department on 9/30/2023.  He may return to work on 10/04/2023.       If you have any questions or concerns, please don't hesitate to call.      Winnie Leong NP"

## 2023-09-30 NOTE — DISCHARGE INSTRUCTIONS
Please follow up with [facility] for further evaluation and recheck.  You can apply topical bacitracin to your penis.  Alternate Tylenol and ibuprofen as needed for pain.  Please return to the ED for worsening pain, penile swelling, penile discharge, testicular pain or swelling, fever, worsening abdominal pain, vomiting, diarrhea, or any new or worsening concerns.

## 2023-09-30 NOTE — ED PROVIDER NOTES
Encounter Date: 9/30/2023  See mid-level's note for details. I saw and evaluated the patient and agree with the [facility]'s finding and plans as written.   I completed my own history and physical exam of this patient.  Including genital exam that was chaperoned by nurse chaperone.  Patient with localized point tenderness to the mid shaft of the penis with no skin changes.  No penile discharge.  Small inguinal hernia palpated to the left inguinal area with no tenderness to palpation or skin changes.  No rash.  Discussed that most likely patient with tenderness from with the patient reported as increased sexual activity over the weekend.  Reports that during this time period of multiple rounds of intercourse is 1 his penis started hurting.  Discussed rest, monitoring symptoms.  Patient reports possibility of exposure to sexually transmitted infection therefore treated prophylactically here after benefits and risks discussion with patient.  Patient to follow up on chlamydia and gonorrhea results.  Discussed that it was possible that this could be early signs of herpes and to monitor for rash.  Discussed need for close follow-up and strict return precautions discussed.  Radha Beal MD  Emergency Medicine Staff Physician         History     Chief Complaint   Patient presents with    Dysuria     SEEN AND TREATED FOR UTI ON TUES,PUT ON ANTIBX    Exposure to STD     Patient is a 32 y.o. male with no PMH of bladder infections who presents to ED via self for concern for abdominal pain and penile pain which began 8 day(s) ago.  Patient reports last Saturday started feeling sore in his pelvic area.  Patient reports on Monday started having burning with urination.  Patient reports on Tuesday went to an urgent care who told him that he was urine was cleaned but he had swollen lymph nodes in his bilateral groin.  Patient reports they place him on Bactrim.  Patient reports he has been taking the antibiotic as prescribed but his  symptoms have not improved.  Patient reports they told him he could possibly have an inguinal hernia.  Patient denies hematuria, penile discharge, or rash in his genital area.  Patient reports pain in the shaft of his penis near the tip.  Patient denies any known STI exposure but reports he would like to be checked.  Patient reports he has a bowel movement every other day and today when he had bowel movement he noticed blood when he wiped.  Patient denies any blood in his stool or in the toilet.  Patient denies straining when having a bowel movement.  Patient denies a history of hemorrhoids.  Patient is awake and alert in no acute distress.         Review of patient's allergies indicates:   Allergen Reactions    Shellfish containing products Swelling     No past medical history on file.  No past surgical history on file.  No family history on file.  Social History     Tobacco Use    Smoking status: Some Days   Substance Use Topics    Alcohol use: Yes     Comment: sometimes    Drug use: No     Review of Systems   Constitutional:  Negative for fever.   HENT: Negative.  Negative for congestion and sore throat.    Respiratory:  Negative for cough and shortness of breath.    Cardiovascular:  Negative for chest pain.   Gastrointestinal:  Positive for abdominal pain. Negative for abdominal distention, blood in stool, diarrhea, nausea and vomiting.   Genitourinary:  Positive for dysuria and penile pain. Negative for decreased urine volume, difficulty urinating, flank pain, genital sores, hematuria, penile discharge, penile swelling, scrotal swelling, testicular pain and urgency.   Musculoskeletal:  Negative for back pain.   Skin:  Negative for color change, pallor and rash.   Neurological: Negative.  Negative for weakness.   Hematological:  Does not bruise/bleed easily.   Psychiatric/Behavioral: Negative.         Physical Exam     Initial Vitals [09/30/23 0909]   BP Pulse Resp Temp SpO2   (!) 151/96 94 16 98.6 °F (37 °C) 99  %      MAP       --         Physical Exam    Nursing note and vitals reviewed.  Constitutional: He appears well-developed and well-nourished. He is not diaphoretic. No distress.   HENT:   Head: Normocephalic and atraumatic.   Right Ear: External ear normal.   Left Ear: External ear normal.   Eyes: EOM are normal.   Neck:   Normal range of motion.  Cardiovascular:  Normal rate, regular rhythm, normal heart sounds and intact distal pulses.     Exam reveals no gallop and no friction rub.       No murmur heard.  Pulmonary/Chest: Breath sounds normal. No respiratory distress. He has no wheezes. He has no rhonchi. He has no rales. He exhibits no tenderness.   Abdominal: Abdomen is soft and flat. Bowel sounds are normal. He exhibits no distension and no mass. There is abdominal tenderness in the left lower quadrant. No hernia. There is no rebound and no guarding.   Musculoskeletal:         General: Normal range of motion.      Cervical back: Normal range of motion.     Neurological: He is alert and oriented to person, place, and time. He has normal strength. GCS score is 15. GCS eye subscore is 4. GCS verbal subscore is 5. GCS motor subscore is 6.   Skin: Skin is warm and dry. Capillary refill takes less than 2 seconds.   Psychiatric: He has a normal mood and affect. His behavior is normal. Judgment and thought content normal.         ED Course   Procedures  Labs Reviewed   URINALYSIS, REFLEX TO URINE CULTURE - Abnormal; Notable for the following components:       Result Value    Specific Gravity, UA >1.030 (*)     Protein, UA Trace (*)     All other components within normal limits    Narrative:     Specimen Source->Urine   C. TRACHOMATIS/N. GONORRHOEAE BY AMP DNA   RAPID HIV    Narrative:     Release to patient->Immediate   RPR   HERPES SIMPLEX VIRUS (HSV) TYPE 1 & 2 DNA BY PCR          Imaging Results    None          Medications   acetaminophen tablet 1,000 mg (1,000 mg Oral Given 9/30/23 0960)   cefTRIAXone (ROCEPHIN)  500 mg in dextrose 5 % (D5W) 100 mL IVPB (0 mg Intravenous Stopped 9/30/23 1242)   doxycycline capsule 100 mg (100 mg Oral Given 9/30/23 1211)   bacitracin ointment ( Topical (Top) Given 9/30/23 1309)     Medical Decision Making  [facility]    Patient presents for emergent evaluation of acute penile pain that poses a possible threat to life and/or bodily function.    Differential diagnosis included but not limited to STI, urinary tract infection, balanitis, testicular torsion, appendicitis.  In the ED patient found to have acute pain to palpation on the underneath side of the penis on the shaft underneath the glans penis.  There is no rash or lesion noted.  There is no swelling of the penis.  There is no testicle swelling or pain.  There is no other rashes noted in the genital area.  Patient has woke up possibly an inguinal hernia on the left side that is easily reducible and only palpable when he was straining.  Patient has normal bowel sounds in all 4 quadrant with a soft abdomen.  Patient only reports mild pain to palpation of the left lower quadrant.    Discharge [facility]  I discussed the patient presentation labs findings with my attending Dr. eBal who individually evaluated the patient.  Low suspicion for anything intra-abdominal at this time.  Patient's UA is negative for infection.  Patient reports most of the pain is felt in the underneath side of his penis on the shaft.  Patient reports ever use of his penis during sexual intercourse over this past week.  Patient requests to be prophylactically treated for gonorrhea and chlamydia while in the ED.    Patient was managed in the ED with IV Rocephin and oral doxycycline.  Patient given topical bacitracin to apply to his penis to treat for possible bacterial cause of balanitis.    The response to treatment was good.    Patient was discharged in stable condition with close follow up with primary care.  Patient given access health information.  Detailed return  precautions discussed to return to the ED for worsening penile pain, penile swelling, testicular pain, testicular swelling, inability to urinate, penile discharge, any genital rashes, worsening abdominal pain, vomiting, diarrhea, or fever, or any new or worsening symptoms.  Patient states understanding.    Amount and/or Complexity of Data Reviewed  Labs: ordered.     Details: Pending gonorrhea chlamydia, pending RPR, pending HSV, UA significant for trace protein and specific gravity >1.030, negative rapid HIV    Risk  OTC drugs.  Prescription drug management.                               Clinical Impression:   Final diagnoses:  [Z20.2] Possible exposure to STD  [N48.89] Penile pain (Primary)        ED Disposition Condition    Discharge Stable          ED Prescriptions       Medication Sig Dispense Start Date End Date Auth. Provider    doxycycline (VIBRAMYCIN) 100 MG Cap Take 1 capsule (100 mg total) by mouth 2 (two) times daily. for 7 days 14 capsule 9/30/2023 10/7/2023 Winnie Leong NP    bacitracin 500 unit/gram Oint Apply topically 2 (two) times daily. for 7 days 28 g 9/30/2023 10/7/2023 Winnie Leong NP          Follow-up Information       Follow up With Specialties Details Why Contact Info Additional Information    [facility]  Schedule an appointment as soon as possible for a visit  For primary care, For recheck/continuing care [address] [facility] [address]  [phone]       On license of [facility] - Emergency Dept Emergency Medicine  If symptoms worsen [address] [facility] [id number] [phone] 1st floor             Winnie Leong NP  09/30/23 2720       Radha Beal MD  10/05/23 8234

## 2023-10-02 LAB — RPR SER QL: NORMAL

## 2023-10-04 LAB
CHLAMYDIA, AMPLIFIED DNA: NEGATIVE
HSV-1 DNA BY PCR: NEGATIVE
HSV-2 DNA BY PCR: NEGATIVE
N GONORRHOEAE, AMPLIFIED DNA: NEGATIVE